# Patient Record
Sex: FEMALE | Race: WHITE | NOT HISPANIC OR LATINO | Employment: OTHER | URBAN - METROPOLITAN AREA
[De-identification: names, ages, dates, MRNs, and addresses within clinical notes are randomized per-mention and may not be internally consistent; named-entity substitution may affect disease eponyms.]

---

## 2022-07-18 ENCOUNTER — TRANSCRIBE ORDERS (OUTPATIENT)
Dept: ADMINISTRATIVE | Facility: HOSPITAL | Age: 57
End: 2022-07-18

## 2022-07-18 DIAGNOSIS — Z12.31 VISIT FOR SCREENING MAMMOGRAM: Primary | ICD-10-CM

## 2022-07-25 ENCOUNTER — APPOINTMENT (OUTPATIENT)
Dept: OTHER | Facility: HOSPITAL | Age: 57
End: 2022-07-25

## 2022-07-25 ENCOUNTER — HOSPITAL ENCOUNTER (OUTPATIENT)
Dept: MAMMOGRAPHY | Facility: HOSPITAL | Age: 57
Discharge: HOME | End: 2022-07-25
Admitting: STUDENT IN AN ORGANIZED HEALTH CARE EDUCATION/TRAINING PROGRAM

## 2022-07-25 DIAGNOSIS — Z12.31 VISIT FOR SCREENING MAMMOGRAM: ICD-10-CM

## 2022-07-25 PROCEDURE — 77067 SCR MAMMO BI INCL CAD: CPT | Performed by: RADIOLOGY

## 2022-07-25 PROCEDURE — 77063 BREAST TOMOSYNTHESIS BI: CPT | Performed by: RADIOLOGY

## 2022-07-25 PROCEDURE — 77067 SCR MAMMO BI INCL CAD: CPT

## 2022-07-25 PROCEDURE — 77063 BREAST TOMOSYNTHESIS BI: CPT

## 2023-10-09 ENCOUNTER — TRANSCRIBE ORDERS (OUTPATIENT)
Dept: ADMINISTRATIVE | Facility: HOSPITAL | Age: 58
End: 2023-10-09
Payer: COMMERCIAL

## 2023-10-09 DIAGNOSIS — Z12.31 SCREENING MAMMOGRAM FOR BREAST CANCER: Primary | ICD-10-CM

## 2023-11-20 ENCOUNTER — HOSPITAL ENCOUNTER (OUTPATIENT)
Dept: MAMMOGRAPHY | Facility: HOSPITAL | Age: 58
Discharge: HOME | End: 2023-11-20
Admitting: INTERNAL MEDICINE
Payer: COMMERCIAL

## 2023-11-20 DIAGNOSIS — Z12.31 SCREENING MAMMOGRAM FOR BREAST CANCER: ICD-10-CM

## 2023-11-20 PROCEDURE — 77063 BREAST TOMOSYNTHESIS BI: CPT

## 2023-11-20 PROCEDURE — 77067 SCR MAMMO BI INCL CAD: CPT

## 2024-01-04 ENCOUNTER — OFFICE VISIT (OUTPATIENT)
Dept: INTERNAL MEDICINE | Facility: CLINIC | Age: 59
End: 2024-01-04
Payer: COMMERCIAL

## 2024-01-04 ENCOUNTER — LAB (OUTPATIENT)
Dept: LAB | Facility: HOSPITAL | Age: 59
End: 2024-01-04
Payer: COMMERCIAL

## 2024-01-04 VITALS
HEART RATE: 68 BPM | BODY MASS INDEX: 29.41 KG/M2 | HEIGHT: 66 IN | OXYGEN SATURATION: 96 % | DIASTOLIC BLOOD PRESSURE: 74 MMHG | TEMPERATURE: 97.8 F | WEIGHT: 183 LBS | SYSTOLIC BLOOD PRESSURE: 128 MMHG

## 2024-01-04 DIAGNOSIS — J45.990 EXERCISE-INDUCED ASTHMA: ICD-10-CM

## 2024-01-04 DIAGNOSIS — Z00.00 ANNUAL PHYSICAL EXAM: ICD-10-CM

## 2024-01-04 DIAGNOSIS — E78.00 PURE HYPERCHOLESTEROLEMIA: ICD-10-CM

## 2024-01-04 DIAGNOSIS — Z91.09 ENVIRONMENTAL ALLERGIES: ICD-10-CM

## 2024-01-04 DIAGNOSIS — E55.9 VITAMIN D INSUFFICIENCY: ICD-10-CM

## 2024-01-04 DIAGNOSIS — A60.09 HERPES SIMPLEX INFECTION OF OTHER SITE OF GENITOURINARY TRACT: ICD-10-CM

## 2024-01-04 DIAGNOSIS — Z11.59 ENCOUNTER FOR HEPATITIS C SCREENING TEST FOR LOW RISK PATIENT: ICD-10-CM

## 2024-01-04 DIAGNOSIS — Z85.828 HISTORY OF SKIN CANCER: ICD-10-CM

## 2024-01-04 DIAGNOSIS — Z00.00 ANNUAL PHYSICAL EXAM: Primary | ICD-10-CM

## 2024-01-04 PROBLEM — A60.00 HERPES SIMPLEX INFECTION OF GENITOURINARY SYSTEM: Status: ACTIVE | Noted: 2024-01-04

## 2024-01-04 PROBLEM — A60.04 HERPES SIMPLEX VULVOVAGINITIS: Status: ACTIVE | Noted: 2024-01-04

## 2024-01-04 LAB
25(OH)D3 SERPL-MCNC: 25 NG/ML (ref 30–100)
ALBUMIN SERPL-MCNC: 4.9 G/DL (ref 3.5–5.2)
ALBUMIN/GLOB SERPL: 2 G/DL
ALP SERPL-CCNC: 69 U/L (ref 39–117)
ALT SERPL W P-5'-P-CCNC: 65 U/L (ref 1–33)
ANION GAP SERPL CALCULATED.3IONS-SCNC: 11 MMOL/L (ref 5–15)
AST SERPL-CCNC: 58 U/L (ref 1–32)
BILIRUB SERPL-MCNC: 0.3 MG/DL (ref 0–1.2)
BUN SERPL-MCNC: 15 MG/DL (ref 8–23)
BUN/CREAT SERPL: 17.4 (ref 7–25)
CALCIUM SPEC-SCNC: 9.9 MG/DL (ref 8.6–10.5)
CHLORIDE SERPL-SCNC: 101 MMOL/L (ref 98–107)
CHOLEST SERPL-MCNC: 300 MG/DL (ref 0–200)
CO2 SERPL-SCNC: 25 MMOL/L (ref 22–29)
CREAT SERPL-MCNC: 0.86 MG/DL (ref 0.57–1)
DEPRECATED RDW RBC AUTO: 38.7 FL (ref 37–54)
EGFRCR SERPLBLD CKD-EPI 2021: 77.5 ML/MIN/1.73
ERYTHROCYTE [DISTWIDTH] IN BLOOD BY AUTOMATED COUNT: 12.3 % (ref 12.3–15.4)
GLOBULIN UR ELPH-MCNC: 2.4 GM/DL
GLUCOSE SERPL-MCNC: 83 MG/DL (ref 65–99)
HBA1C MFR BLD: 5.5 % (ref 4.8–5.6)
HCT VFR BLD AUTO: 44.2 % (ref 34–46.6)
HCV AB SER DONR QL: NORMAL
HDLC SERPL-MCNC: 57 MG/DL (ref 40–60)
HGB BLD-MCNC: 14.6 G/DL (ref 12–15.9)
LDLC SERPL CALC-MCNC: 200 MG/DL (ref 0–100)
LDLC/HDLC SERPL: 3.47 {RATIO}
MCH RBC QN AUTO: 28.8 PG (ref 26.6–33)
MCHC RBC AUTO-ENTMCNC: 33 G/DL (ref 31.5–35.7)
MCV RBC AUTO: 87.2 FL (ref 79–97)
PLATELET # BLD AUTO: 395 10*3/MM3 (ref 140–450)
PMV BLD AUTO: 9.5 FL (ref 6–12)
POTASSIUM SERPL-SCNC: 4.1 MMOL/L (ref 3.5–5.2)
PROT SERPL-MCNC: 7.3 G/DL (ref 6–8.5)
RBC # BLD AUTO: 5.07 10*6/MM3 (ref 3.77–5.28)
SODIUM SERPL-SCNC: 137 MMOL/L (ref 136–145)
TRIGL SERPL-MCNC: 225 MG/DL (ref 0–150)
TSH SERPL DL<=0.05 MIU/L-ACNC: 0.77 UIU/ML (ref 0.27–4.2)
VLDLC SERPL-MCNC: 43 MG/DL (ref 5–40)
WBC NRBC COR # BLD AUTO: 9.22 10*3/MM3 (ref 3.4–10.8)

## 2024-01-04 PROCEDURE — 82306 VITAMIN D 25 HYDROXY: CPT

## 2024-01-04 PROCEDURE — 83036 HEMOGLOBIN GLYCOSYLATED A1C: CPT

## 2024-01-04 PROCEDURE — 99386 PREV VISIT NEW AGE 40-64: CPT | Performed by: INTERNAL MEDICINE

## 2024-01-04 PROCEDURE — 80061 LIPID PANEL: CPT

## 2024-01-04 PROCEDURE — 86803 HEPATITIS C AB TEST: CPT

## 2024-01-04 PROCEDURE — 80050 GENERAL HEALTH PANEL: CPT

## 2024-01-04 RX ORDER — FLUTICASONE PROPIONATE 50 MCG
SPRAY, SUSPENSION (ML) NASAL
COMMUNITY
Start: 2023-09-11

## 2024-01-04 RX ORDER — ALBUTEROL SULFATE 90 UG/1
AEROSOL, METERED RESPIRATORY (INHALATION)
COMMUNITY
Start: 2023-09-11

## 2024-01-04 RX ORDER — VALACYCLOVIR HYDROCHLORIDE 500 MG/1
500 TABLET, FILM COATED ORAL DAILY
COMMUNITY

## 2024-01-04 NOTE — PROGRESS NOTES
Internal Medicine New Patient  Josie Malhotra is a 58 y.o. female who presents today to [facility] and with concerns as outlined below.    Chief Complaint  Chief Complaint   Patient presents with    [facility]    Annual Exam        HPI  Ms. Malhotra comes in today to establish care and for her physical. She is generally healthy. Was last a patient of Jennifer Castro and prior to that Dr. Dimas. She last had labs in 2021 that she brings with her today. Notable for slightly low vitamin D, mild hyperlipidemia, mildly elevated platelet count. She takes very few medications. She is on albuterol for suspected exercise induced asthma, flonase for allergies, valtrex daily for HSV, and a vitamin D-zinc-Mg supplement. She is up to date with mammogram and colonoscopy. She does not follow with GYN and is s/p hysterectomy for prolapse. She declines COVID and flu vaccines. Hesitant to get the RSV vaccine. She does report a Tdap within the last 10y. Denies use of tobacco, ecigarettes, illicit drugs, and drinks alcohol socially. She is going to be seeing dermatology soon. Has various skin lesions on her chest and face. She has a history of nonmelanoma skin cancer on her LLE.           Review of Systems  Review of Systems   Constitutional:  Positive for fatigue.   HENT:  Negative for hearing loss.    Eyes: Negative.    Respiratory: Negative.     Cardiovascular: Negative.    Gastrointestinal:  Positive for diarrhea (intermittently), GERD and indigestion.   Genitourinary: Negative.    Musculoskeletal: Negative.    Skin:  Positive for skin lesions.   Allergic/Immunologic: Positive for environmental allergies.   Neurological:  Positive for memory problem (forgetful).   Psychiatric/Behavioral: Negative.          Past Medical History  History reviewed. No pertinent past medical history.     Surgical History  Past Surgical History:   Procedure Laterality Date    APPENDECTOMY      CATARACT EXTRACTION Bilateral     CHOLECYSTECTOMY       "ECTOPIC PREGNANCY SURGERY      did not have to have fallopian tube removed    HYSTERECTOMY      2002, due to prolapse. Partial with ovaries remaining.    LAPAROSCOPIC TUBAL LIGATION      SKIN CANCER EXCISION Left     LLE        Family History  Family History   Problem Relation Age of Onset    Thyroid disease Mother     Hyperlipidemia Mother     Diabetes Father     Heart disease Father     No Known Problems Brother     Heart disease Brother     Heart disease Paternal Grandfather     Dementia Other         2 out of 4 of dad's siblings    Breast cancer Neg Hx     Ovarian cancer Neg Hx         Social History  Social History     Socioeconomic History    Marital status:    Tobacco Use    Smoking status: Never     Passive exposure: Never    Smokeless tobacco: Never   Vaping Use    Vaping Use: Never used   Substance and Sexual Activity    Alcohol use: Yes     Comment: socially    Drug use: Never    Sexual activity: Yes        Current Medications  Current Outpatient Medications on File Prior to Visit   Medication Sig Dispense Refill    albuterol sulfate  (90 Base) MCG/ACT inhaler       fluticasone (FLONASE) 50 MCG/ACT nasal spray       MAGNESIUM GLYCINATE PO Take  by mouth.      Multiple Vitamins-Minerals (ZINC PO) Take  by mouth.      valACYclovir (VALTREX) 500 MG tablet Take 1 tablet by mouth Daily.      VITAMIN D PO Take  by mouth.      [DISCONTINUED] meloxicam (MOBIC) 7.5 MG tablet Take 1 tablet by mouth Daily. 30 tablet 0     No current facility-administered medications on file prior to visit.       Allergies  No Known Allergies     Objective  Visit Vitals  /74   Pulse 68   Temp 97.8 °F (36.6 °C)   Ht 167.6 cm (66\")   Wt 83 kg (183 lb)   SpO2 96%   BMI 29.54 kg/m²        Physical Exam  Physical Exam  Vitals and nursing note reviewed.   Constitutional:       General: She is not in acute distress.     Appearance: She is well-developed. She is not ill-appearing, toxic-appearing or diaphoretic.   HENT: "      Head: Normocephalic and atraumatic.      Right Ear: Tympanic membrane, ear canal and external ear normal.      Left Ear: Tympanic membrane, ear canal and external ear normal.      Nose: Nose normal.   Eyes:      General: No scleral icterus.     Conjunctiva/sclera: Conjunctivae normal.   Neck:      Thyroid: No thyromegaly.   Cardiovascular:      Rate and Rhythm: Normal rate and regular rhythm.      Heart sounds: Normal heart sounds. No murmur heard.  Pulmonary:      Effort: Pulmonary effort is normal. No respiratory distress.      Breath sounds: Normal breath sounds. No wheezing.   Abdominal:      General: There is no distension.      Palpations: Abdomen is soft. There is no mass.      Tenderness: There is no abdominal tenderness.   Musculoskeletal:         General: No deformity.      Cervical back: Neck supple.      Right lower leg: No edema.      Left lower leg: No edema.   Lymphadenopathy:      Cervical: No cervical adenopathy.   Skin:     General: Skin is warm and dry.   Neurological:      General: No focal deficit present.      Mental Status: She is alert and oriented to person, place, and time.      Gait: Gait normal.   Psychiatric:         Mood and Affect: Mood normal.         Behavior: Behavior normal.         Thought Content: Thought content normal.         Judgment: Judgment normal.          Results  No results found for this or any previous visit.     Assessment and Plan  Diagnoses and all orders for this visit:    Annual physical exam  - Counseling was given to patient for the following topics:  healthy eating habits, disease prevention, importance of self breast exam and breast health, and importance of immunizations, including risks and benefits. Also discussed the importance of regular dental and vision care, as well recommendation for a yearly screening skin exam.  -     CBC (No Diff); Future  -     Comprehensive Metabolic Panel; Future  -     TSH Rfx On Abnormal To Free T4; Future  -      Hemoglobin A1c; Future    Exercise-induced asthma  - uses albuterol PRN    Environmental allergies  - on flonase    Herpes simplex infection of other site of genitourinary tract  - on valtrex 500mg daily for ppx    Pure hypercholesterolemia  - update lipid panel    Vitamin D insufficiency  - takes a low dose vitamin D supplement  - will update vitamin D level    History of skin cancer  - follows with dermatology and reports upcoming appointment    Encounter for hepatitis C screening test for low risk patient  -     Hepatitis C Antibody; Future      Health Maintenance   Topic Date Due    ZOSTER VACCINE (1 of 2) Never done    HEPATITIS C SCREENING  Never done    ANNUAL PHYSICAL  Never done    LIPID PANEL  Never done    TDAP/TD VACCINES (1 - Tdap) 01/01/2026 (Originally 10/9/1982)    MAMMOGRAM  11/20/2024    BMI FOLLOWUP  01/04/2025    COLORECTAL CANCER SCREENING  10/24/2032    Pneumococcal Vaccine 0-64  Aged Out    COVID-19 Vaccine  Discontinued    INFLUENZA VACCINE  Discontinued     Health Maintenance  - Pap smear: s/p hysterectomy  - Mammogram: 11/2023 BIRADS 1  - Colonoscopy: 10/2022, repeat 10y  - HCV: ordered  - Immunizations: flu and COVID declined. RSV discussed. Tdap UTD.  - Depression screening: negative 1/2024    Return in about 1 year (around 1/4/2025) for Annual, Labs today.

## 2024-01-05 ENCOUNTER — PATIENT ROUNDING (BHMG ONLY) (OUTPATIENT)
Dept: INTERNAL MEDICINE | Facility: CLINIC | Age: 59
End: 2024-01-05
Payer: COMMERCIAL

## 2024-01-05 NOTE — PROGRESS NOTES
A My-chart message has been sent to the patient for Patient Rounding with [facility].

## 2024-01-08 ENCOUNTER — PATIENT MESSAGE (OUTPATIENT)
Dept: INTERNAL MEDICINE | Facility: CLINIC | Age: 59
End: 2024-01-08
Payer: COMMERCIAL

## 2024-01-08 DIAGNOSIS — E78.00 PURE HYPERCHOLESTEROLEMIA: Primary | ICD-10-CM

## 2024-01-09 DIAGNOSIS — R74.01 TRANSAMINITIS: Primary | ICD-10-CM

## 2024-02-21 ENCOUNTER — LAB (OUTPATIENT)
Dept: LAB | Facility: HOSPITAL | Age: 59
End: 2024-02-21
Payer: COMMERCIAL

## 2024-02-21 DIAGNOSIS — R74.01 TRANSAMINITIS: ICD-10-CM

## 2024-02-21 DIAGNOSIS — E78.00 PURE HYPERCHOLESTEROLEMIA: ICD-10-CM

## 2024-02-21 LAB
ALBUMIN SERPL-MCNC: 4.5 G/DL (ref 3.5–5.2)
ALP SERPL-CCNC: 60 U/L (ref 39–117)
ALT SERPL W P-5'-P-CCNC: 22 U/L (ref 1–33)
AST SERPL-CCNC: 18 U/L (ref 1–32)
BILIRUB CONJ SERPL-MCNC: <0.2 MG/DL (ref 0–0.3)
BILIRUB INDIRECT SERPL-MCNC: NORMAL MG/DL
BILIRUB SERPL-MCNC: 0.4 MG/DL (ref 0–1.2)
CHOLEST SERPL-MCNC: 214 MG/DL (ref 0–200)
HDLC SERPL-MCNC: 53 MG/DL (ref 40–60)
LDLC SERPL CALC-MCNC: 137 MG/DL (ref 0–100)
LDLC/HDLC SERPL: 2.53 {RATIO}
PROT SERPL-MCNC: 6.8 G/DL (ref 6–8.5)
TRIGL SERPL-MCNC: 134 MG/DL (ref 0–150)
VLDLC SERPL-MCNC: 24 MG/DL (ref 5–40)

## 2024-02-21 PROCEDURE — 80076 HEPATIC FUNCTION PANEL: CPT

## 2024-02-21 PROCEDURE — 80061 LIPID PANEL: CPT

## 2024-04-09 ENCOUNTER — PATIENT MESSAGE (OUTPATIENT)
Dept: INTERNAL MEDICINE | Facility: CLINIC | Age: 59
End: 2024-04-09
Payer: COMMERCIAL

## 2024-04-09 DIAGNOSIS — A60.09 HERPES SIMPLEX INFECTION OF OTHER SITE OF GENITOURINARY TRACT: ICD-10-CM

## 2024-04-09 RX ORDER — VALACYCLOVIR HYDROCHLORIDE 1 G/1
1000 TABLET, FILM COATED ORAL DAILY
Qty: 90 TABLET | Refills: 4 | Status: SHIPPED | OUTPATIENT
Start: 2024-04-09

## 2024-08-12 ENCOUNTER — HOSPITAL ENCOUNTER (OUTPATIENT)
Dept: GENERAL RADIOLOGY | Facility: HOSPITAL | Age: 59
Discharge: HOME | End: 2024-08-12
Admitting: NURSE PRACTITIONER
Payer: COMMERCIAL

## 2024-08-12 ENCOUNTER — OFFICE VISIT (OUTPATIENT)
Dept: INTERNAL MEDICINE | Facility: CLINIC | Age: 59
End: 2024-08-12
Payer: COMMERCIAL

## 2024-08-12 VITALS
DIASTOLIC BLOOD PRESSURE: 72 MMHG | SYSTOLIC BLOOD PRESSURE: 116 MMHG | HEIGHT: 66 IN | HEART RATE: 65 BPM | BODY MASS INDEX: 28.48 KG/M2 | OXYGEN SATURATION: 98 % | WEIGHT: 177.2 LBS

## 2024-08-12 DIAGNOSIS — R10.9 SIDE PAIN: Primary | ICD-10-CM

## 2024-08-12 DIAGNOSIS — R10.9 LEFT FLANK PAIN: ICD-10-CM

## 2024-08-12 DIAGNOSIS — M54.6 ACUTE LEFT-SIDED THORACIC BACK PAIN: ICD-10-CM

## 2024-08-12 DIAGNOSIS — R10.9 SIDE PAIN: ICD-10-CM

## 2024-08-12 LAB
BILIRUB BLD-MCNC: NEGATIVE MG/DL
CLARITY, POC: CLEAR
COLOR UR: ABNORMAL
EXPIRATION DATE: ABNORMAL
GLUCOSE UR STRIP-MCNC: NEGATIVE MG/DL
KETONES UR QL: NEGATIVE
LEUKOCYTE EST, POC: NEGATIVE
Lab: ABNORMAL
NITRITE UR-MCNC: NEGATIVE MG/ML
PH UR: 6 [PH] (ref 5–8)
PROT UR STRIP-MCNC: NEGATIVE MG/DL
RBC # UR STRIP: NEGATIVE /UL
SP GR UR: 1.01 (ref 1–1.03)
UROBILINOGEN UR QL: ABNORMAL

## 2024-08-12 PROCEDURE — 74018 RADEX ABDOMEN 1 VIEW: CPT

## 2024-08-12 PROCEDURE — 99214 OFFICE O/P EST MOD 30 MIN: CPT | Performed by: NURSE PRACTITIONER

## 2024-08-12 PROCEDURE — 81003 URINALYSIS AUTO W/O SCOPE: CPT | Performed by: NURSE PRACTITIONER

## 2024-08-12 RX ORDER — CYCLOBENZAPRINE HCL 5 MG
10 TABLET ORAL NIGHTLY PRN
Qty: 15 TABLET | Refills: 0 | Status: SHIPPED | OUTPATIENT
Start: 2024-08-12

## 2024-08-12 NOTE — PROGRESS NOTES
Office Note     Name: Josie Malhotra    : 1965     MRN: [id number]     Chief Complaint  Back Pain (Patient reports having lower back pain going on for around 3 weeks. Patient states she did have appendicitic back in 2018 and she says the pain feels exactly like that. Patient states its almost like everything is swollen. )    Subjective     History of Present Illness:  Josie Malhotra is a 58 y.o. female who presents today for evaluation of low back pain.  Patient reports symptoms have been ongoing for the past 3 weeks.  Patient describes the pain to her left thoracic area.  She reports the pain radiates around to the side of her abdomen.  She has had a similar feeling in the past that was relieved by heat.  She has tried heat this time with no improvement in symptoms.  She reports the pain and discomfort has previously come and gone until now.  She reports it has been constant and persistent since yesterday.  She reports the area feels full and swollen.  She initially thought it was muscular but has not yet gone away.  She denies any nausea, vomiting, diarrhea, or constipation.  She denies any changes to her urine.  She denies any history of kidney stones.  She denies any history of hematuria.  She has been taking Tylenol as needed.  She has also been drinking more water recently.  She follows with Dr. Rayo for chronic conditions.  She presents today for evaluation of the above acute complaints.  No further complaints or concerns at this time.  Pleasant visit with the patient today.      History reviewed. No pertinent past medical history.    Past Surgical History:   Procedure Laterality Date    APPENDECTOMY      CATARACT EXTRACTION Bilateral     CHOLECYSTECTOMY      ECTOPIC PREGNANCY SURGERY      did not have to have fallopian tube removed    HYSTERECTOMY      , due to prolapse. Partial with ovaries remaining.    LAPAROSCOPIC TUBAL LIGATION      SKIN CANCER EXCISION Left     LLE       Social History  "    Socioeconomic History    Marital status:    Tobacco Use    Smoking status: Never     Passive exposure: Never    Smokeless tobacco: Never   Vaping Use    Vaping status: Never Used   Substance and Sexual Activity    Alcohol use: Yes     Comment: socially    Drug use: Never    Sexual activity: Yes         Current Outpatient Medications:     albuterol sulfate  (90 Base) MCG/ACT inhaler, , Disp: , Rfl:     fluticasone (FLONASE) 50 MCG/ACT nasal spray, , Disp: , Rfl:     MAGNESIUM GLYCINATE PO, Take  by mouth., Disp: , Rfl:     Multiple Vitamins-Minerals (ZINC PO), Take  by mouth., Disp: , Rfl:     valACYclovir (VALTREX) 1000 MG tablet, Take 1 tablet by mouth Daily., Disp: 90 tablet, Rfl: 4    VITAMIN D PO, Take  by mouth., Disp: , Rfl:     cyclobenzaprine (FLEXERIL) 5 MG tablet, Take 2 tablets by mouth At Night As Needed for Muscle Spasms., Disp: 15 tablet, Rfl: 0    Objective     Vital Signs  /72   Pulse 65   Ht 167.6 cm (66\")   Wt 80.4 kg (177 lb 3.2 oz)   SpO2 98%   BMI 28.60 kg/m²   Estimated body mass index is 28.6 kg/m² as calculated from the following:    Height as of this encounter: 167.6 cm (66\").    Weight as of this encounter: 80.4 kg (177 lb 3.2 oz).           Physical Exam  Constitutional:       General: She is not in acute distress.     Appearance: Normal appearance. She is not ill-appearing.   HENT:      Head: Normocephalic and atraumatic.      Nose: Nose normal.   Eyes:      Extraocular Movements: Extraocular movements intact.      Conjunctiva/sclera: Conjunctivae normal.      Pupils: Pupils are equal, round, and reactive to light.   Cardiovascular:      Rate and Rhythm: Normal rate.   Pulmonary:      Effort: Pulmonary effort is normal. No respiratory distress.   Abdominal:      Tenderness: There is abdominal tenderness.   Musculoskeletal:         General: Normal range of motion.      Cervical back: Neck supple.   Skin:     General: Skin is warm and dry.   Neurological:      " General: No focal deficit present.      Mental Status: She is alert and oriented to person, place, and time. Mental status is at baseline.   Psychiatric:         Mood and Affect: Mood normal.         Behavior: Behavior normal.         Thought Content: Thought content normal.         Judgment: Judgment normal.          Assessment and Plan     Diagnoses and all orders for this visit:    1. Side pain (Primary)  -     XR Abdomen KUB; Future  -     cyclobenzaprine (FLEXERIL) 5 MG tablet; Take 2 tablets by mouth At Night As Needed for Muscle Spasms.  Dispense: 15 tablet; Refill: 0  -     POCT urinalysis dipstick, automated    2. Left flank pain  -     XR Abdomen KUB; Future  -     cyclobenzaprine (FLEXERIL) 5 MG tablet; Take 2 tablets by mouth At Night As Needed for Muscle Spasms.  Dispense: 15 tablet; Refill: 0  -     POCT urinalysis dipstick, automated    3. Acute left-sided thoracic back pain  -     XR Abdomen KUB; Future  -     cyclobenzaprine (FLEXERIL) 5 MG tablet; Take 2 tablets by mouth At Night As Needed for Muscle Spasms.  Dispense: 15 tablet; Refill: 0  -     POCT urinalysis dipstick, automated    Plan:  UA in the office today bland.  Orders placed for KUB for further evaluation of symptoms.  May continue with Tylenol as needed.  Flexeril sent to the pharmacy on file to assist with symptoms.  May take nightly as needed.  Further plan of care based on imaging results.  Keep scheduled follow-up appointment with Dr. Rayo.    Follow Up  Return if symptoms worsen or fail to improve, for Follow up with Dr. Rayo.    VICTORINO Turner    Part of this note may be an electronic transcription/translation of spoken language to printed text using the Dragon Dictation System.  Answers submitted by the patient for this visit:  Primary Reason for Visit (Submitted on 8/12/2024)  What is the primary reason for your visit?: Back Pain  Back Pain Questionnaire (Submitted on 8/12/2024)  Chief Complaint: Back pain  Chronicity:  new  Onset: 1 to 4 weeks ago  Frequency: intermittently  Progression since onset: worsening  Pain location: lumbar spine  Pain quality: aching, stabbing  Radiates to: left buttock  Pain - numeric: 8/10  Pain is: the same all the time  Aggravated by: position  Stiffness is present: all day  abdominal pain: No  bladder incontinence: No  bowel incontinence: No  chest pain: No  dysuria: No  fever: No  leg pain: No  numbness: No  paresthesias: No  pelvic pain: No  perianal numbness: No  tingling: No  weakness: No  weight loss: No  Risk factors: menopause

## 2024-09-05 NOTE — PROGRESS NOTES
"    Office Note     Name: Josie Malhotra    : 1965     MRN: [id number]     Chief Complaint  Foot Pain (Bilateral- \"nodules in arches of both feet\" )    Subjective     History of Present Illness:  Josie Malhotra is a 58 y.o. female who presents today for foot pain.    Patient regularly follows with Dr. Rayo with a next scheduled visit in January    Patient sent a message through the portal on  regarding the lumps in the arches of her feet.  She did google and there was a concern for a plantar fibroma.  She does describe them as painful.  Patient states that she does enjoy getting a pedicure because a massage is helpful on the discomfort of her feet.  She does work to wear well supporting well-fitting shoes.        Past Medical History:   Diagnosis Date    Cataract 2022 - surgery    Cholelithiasis Removed       Past Surgical History:   Procedure Laterality Date    APPENDECTOMY      CATARACT EXTRACTION Bilateral     CHOLECYSTECTOMY      COLONOSCOPY      ECTOPIC PREGNANCY SURGERY      did not have to have fallopian tube removed    HYSTERECTOMY      , due to prolapse. Partial with ovaries remaining.    LAPAROSCOPIC TUBAL LIGATION      SKIN CANCER EXCISION Left     LLE    SUBTOTAL HYSTERECTOMY  Nov ???       Social History     Socioeconomic History    Marital status:    Tobacco Use    Smoking status: Never     Passive exposure: Never    Smokeless tobacco: Never   Vaping Use    Vaping status: Never Used   Substance and Sexual Activity    Alcohol use: Not Currently     Comment: socially    Drug use: Never    Sexual activity: Yes     Partners: Male     Birth control/protection: Hysterectomy         Current Outpatient Medications:     albuterol sulfate  (90 Base) MCG/ACT inhaler, , Disp: , Rfl:     cyclobenzaprine (FLEXERIL) 5 MG tablet, Take 2 tablets by mouth At Night As Needed for Muscle Spasms., Disp: 15 tablet, Rfl: 0    fluticasone (FLONASE) 50 MCG/ACT nasal spray, , Disp: , Rfl: " "    MAGNESIUM GLYCINATE PO, Take  by mouth., Disp: , Rfl:     Multiple Vitamins-Minerals (ZINC PO), Take  by mouth., Disp: , Rfl:     valACYclovir (VALTREX) 1000 MG tablet, Take 1 tablet by mouth Daily., Disp: 90 tablet, Rfl: 4    VITAMIN D PO, Take  by mouth., Disp: , Rfl:     Objective     Vital Signs  /78 (BP Location: Left arm, Patient Position: Sitting, Cuff Size: Adult)   Pulse 65   Temp 96.8 °F (36 °C) (Temporal)   Resp 16   Ht 167.6 cm (66\")   Wt 80.8 kg (178 lb 3.2 oz)   SpO2 96%   BMI 28.76 kg/m²   Estimated body mass index is 28.76 kg/m² as calculated from the following:    Height as of this encounter: 167.6 cm (66\").    Weight as of this encounter: 80.8 kg (178 lb 3.2 oz).             Physical Exam  Vitals and nursing note reviewed.   Constitutional:       Appearance: Normal appearance.   HENT:      Head: Normocephalic and atraumatic.   Eyes:      Extraocular Movements: Extraocular movements intact.      Pupils: Pupils are equal, round, and reactive to light.   Cardiovascular:      Rate and Rhythm: Normal rate and regular rhythm.   Pulmonary:      Effort: Pulmonary effort is normal.   Musculoskeletal:         General: Normal range of motion.   Feet:      Comments: On the plantar aspect of both feet there was noted scattered small circular areas that were tender.  No erythema.  No open areas.  No drainage.  No antalgic gait noted  Skin:     General: Skin is warm and dry.   Neurological:      Mental Status: She is alert and oriented to person, place, and time.   Psychiatric:         Mood and Affect: Mood normal.         Behavior: Behavior normal.                   Assessment and Plan     Diagnoses and all orders for this visit:    1. Bilateral foot pain (Primary)  -     XR Foot 3+ View Right; Future  -     XR Foot 3+ View Left; Future    2. Mass of foot, bilateral    Plan  Discussed with patient that we will move forward with x-ray of bilateral feet.  She will ideally have this done within the " next few days.  She will be notified of results as they return  Consider at home interventions of Tylenol and Motrin as you are able.  Ice versus heat at your preference.  Kinesiology tape.  She was provided recommendations about podiatry.  We did discuss she did not need a referral to podiatry but I would recommend she call to make an appointment  Go to ER if any condition worsens or severe  Plan follow-up as scheduled    Follow Up  Return for Next scheduled follow up.    VICTORINO Morgan    Part of this note may be an electronic transcription/translation of spoken language to printed text using the Dragon Dictation System.

## 2024-09-06 ENCOUNTER — OFFICE VISIT (OUTPATIENT)
Dept: INTERNAL MEDICINE | Facility: CLINIC | Age: 59
End: 2024-09-06
Payer: COMMERCIAL

## 2024-09-06 VITALS
HEART RATE: 65 BPM | WEIGHT: 178.2 LBS | TEMPERATURE: 96.8 F | RESPIRATION RATE: 16 BRPM | HEIGHT: 66 IN | DIASTOLIC BLOOD PRESSURE: 78 MMHG | SYSTOLIC BLOOD PRESSURE: 112 MMHG | OXYGEN SATURATION: 96 % | BODY MASS INDEX: 28.64 KG/M2

## 2024-09-06 DIAGNOSIS — R22.43: ICD-10-CM

## 2024-09-06 DIAGNOSIS — M79.671 BILATERAL FOOT PAIN: Primary | ICD-10-CM

## 2024-09-06 DIAGNOSIS — M79.672 BILATERAL FOOT PAIN: Primary | ICD-10-CM

## 2024-09-06 PROCEDURE — 99213 OFFICE O/P EST LOW 20 MIN: CPT | Performed by: NURSE PRACTITIONER

## 2024-10-07 ENCOUNTER — TRANSCRIBE ORDERS (OUTPATIENT)
Dept: ADMINISTRATIVE | Facility: HOSPITAL | Age: 59
End: 2024-10-07
Payer: COMMERCIAL

## 2024-10-07 DIAGNOSIS — Z12.31 SCREENING MAMMOGRAM FOR BREAST CANCER: Primary | ICD-10-CM

## 2024-10-22 ENCOUNTER — OFFICE VISIT (OUTPATIENT)
Age: 59
End: 2024-10-22
Payer: COMMERCIAL

## 2024-10-22 VITALS
DIASTOLIC BLOOD PRESSURE: 80 MMHG | SYSTOLIC BLOOD PRESSURE: 116 MMHG | BODY MASS INDEX: 30.34 KG/M2 | WEIGHT: 182.1 LBS | HEIGHT: 65 IN
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
## 2024-10-22 DIAGNOSIS — S46.002A INJURY OF LEFT ROTATOR CUFF, INITIAL ENCOUNTER: Primary | ICD-10-CM

## 2024-10-22 DIAGNOSIS — M75.42 IMPINGEMENT SYNDROME OF LEFT SHOULDER: ICD-10-CM

## 2024-10-22 DIAGNOSIS — M75.52 BURSITIS OF LEFT SHOULDER: ICD-10-CM

## 2024-10-22 RX ORDER — VERAPAMIL HCL 100 %
POWDER (GRAM) MISCELLANEOUS
COMMUNITY
Start: 2024-09-12

## 2024-10-22 NOTE — PROGRESS NOTES
[facility] [place] Orthopaedic Surgery Office Visit - Wesley Davis MD  [facility]    Office Visit       Patient Name: Josie Malhotra    Chief Complaint:   Chief Complaint   Patient presents with    Left Shoulder - Pain       Referring Physician: Andie Jung APRN    History of Present Illness:   Josie Malhotra is a 59 y.o. female who presents with left body part: shoulder Reason: pain.  Onset:Onset: mechanical fall. The issue has been ongoing for 3 day(s). Pain is a 5/10 on the pain scale. Pain is described as Pain Characterization: dull, aching, and shooting. Associated symptoms include Symptoms: pain. The pain is worse with sleeping and any movement of the joint; resting, sitting, ice, and NSAIDs improve the pain. Previous treatments have included: NSAIDS.  I have reviewed the patient's history of present illness as noted/entered above.    I have reviewed the patient's past medical history, surgical history, social history, family history, medications, and allergies as noted in the electronic medical record and as noted/entered.  I have reviewed the patient's review of systems as noted/enter and updated as noted in the patient's HPI.    Left shoulder   traumatic injury  Mechanical fall 3 days prior to presentation  Felt like something popped out and popped back in after mechanical fall  Urgent treatment center visit on 10/20/2024 visit reviewed;  Date of injury 10/19/2024  Acute injury and inability to lift left shoulder  Stepped on stacked bricks at home, hyperextended left arm  Scrape under elbow  Treated with rest, sling  Enjoys: outdoors, reading/audio books; [employer] -- enjoying with her daughter and friend      59 y.o. female  BMI 30.30    Subjective   Subjective      Review of Systems   Constitutional: Negative.  Negative for chills, fatigue and fever.   HENT: Negative.  Negative for congestion  and dental problem.    Eyes: Negative.  Negative for blurred vision.   Respiratory: Negative.  Negative for shortness of breath.    Cardiovascular: Negative.  Negative for leg swelling.   Gastrointestinal: Negative.  Negative for abdominal pain.   Endocrine: Negative.  Negative for polyuria.   Genitourinary: Negative.  Negative for difficulty urinating.   Musculoskeletal:  Positive for arthralgias.   Skin: Negative.    Allergic/Immunologic: Negative.    Neurological: Negative.    Hematological: Negative.  Negative for adenopathy.   Psychiatric/Behavioral: Negative.  Negative for behavioral problems.         Past Medical History:   Past Medical History:   Diagnosis Date    Cataract Jan 2022 - surgery    Cholelithiasis Removed       Past Surgical History:   Past Surgical History:   Procedure Laterality Date    APPENDECTOMY      CATARACT EXTRACTION Bilateral     CHOLECYSTECTOMY      COLONOSCOPY      ECTOPIC PREGNANCY SURGERY      did not have to have fallopian tube removed    HYSTERECTOMY      2002, due to prolapse. Partial with ovaries remaining.    LAPAROSCOPIC TUBAL LIGATION      SKIN CANCER EXCISION Left     LLE    SUBTOTAL HYSTERECTOMY  Nov ???       Family History:   Family History   Problem Relation Age of Onset    Thyroid disease Mother     Hyperlipidemia Mother     Diabetes Father     Heart disease Father     No Known Problems Brother     Heart disease Brother     Heart disease Paternal Grandfather     Dementia Other         2 out of 4 of dad's siblings    Breast cancer Neg Hx     Ovarian cancer Neg Hx        Social History:   Social History     Socioeconomic History    Marital status:    Tobacco Use    Smoking status: Never     Passive exposure: Never    Smokeless tobacco: Never   Vaping Use    Vaping status: Never Used   Substance and Sexual Activity    Alcohol use: Not Currently     Comment: socially    Drug use: Never    Sexual activity: Yes     Partners: Male     Birth control/protection:  "Hysterectomy       Medications:   Current Outpatient Medications:     albuterol sulfate  (90 Base) MCG/ACT inhaler, , Disp: , Rfl:     cyclobenzaprine (FLEXERIL) 5 MG tablet, Take 2 tablets by mouth At Night As Needed for Muscle Spasms., Disp: 15 tablet, Rfl: 0    fluticasone (FLONASE) 50 MCG/ACT nasal spray, , Disp: , Rfl:     ibuprofen (ADVIL,MOTRIN) 800 MG tablet, Take 1 tablet by mouth Every 8 (Eight) Hours As Needed for Moderate Pain for up to 7 days., Disp: 21 tablet, Rfl: 0    MAGNESIUM GLYCINATE PO, Take  by mouth., Disp: , Rfl:     Multiple Vitamins-Minerals (ZINC PO), Take  by mouth., Disp: , Rfl:     valACYclovir (VALTREX) 1000 MG tablet, Take 1 tablet by mouth Daily., Disp: 90 tablet, Rfl: 4    Verapamil HCl powder, , Disp: , Rfl:     VITAMIN D PO, Take  by mouth., Disp: , Rfl:     Allergies: No Known Allergies    The following portions of the patient's history were reviewed and updated as appropriate: allergies, current medications, past family history, past medical history, past social history, past surgical history and problem list.        Objective    Objective      Vital Signs:   Vitals:    10/22/24 0832   BP: 116/80   Weight: 82.6 kg (182 lb 1.6 oz)   Height: 165.1 cm (65\")       Ortho Exam:  LEFT SHOULDER  General: no acute distress, comfortable  Vitals reviewed in chart    Musculoskeletal Exam    SIDE: LEFT SHOULDER  Shoulder Exam:    Tenderness: rotator cuff    Range of motion measurements (degrees)  Forward flexion/Abduction/External rotation at side/ER at 90/IR at 90/IR position  Active: pain limited 120/120/45/80/60  Passive: pain limited    Painful arc of motion: yes  No evidence of septic joint  Pain with forward flexion and abduction greater: 90 degrees  Impingement testing Neer's test - positive/painful  Impingement testing Hawkin's test - positive/painful    Rotator Cuff Testing:  Tenderness to palpation at rotator cuff - YES  Rotator cuff testing Marvin's test - positive  Rotator " cuff testing External rotation - no  Rotator cuff testing Lag signs - no  Rotator cuff testing Belly press - no  Pain with abduction great than 90 degrees - yes    Scapular dyskinesis - present, abnormal scapular motion    Long head of the biceps testing:  Escudero's test for biceps & Speed's test - mild  Bicipital groove tenderness to palpation/tenderness to palpation of biceps tendon - mild      Results Review:   Imaging Results (Last 24 Hours)       ** No results found for the last 24 hours. **          XR Shoulder 1 View Bilateral    Result Date: 10/20/2024  Negative radiographs of the shoulder. Electronically Signed: Ameya Miguel MD  10/20/2024 12:04 PM EDT  Workstation ID: [id number]      I personally reviewed and interpreted the x-rays above.  Left shoulder 2 views no acute bony findings noted.    Procedures           Assessment / Plan      Assessment/Plan:   Problem List Items Addressed This Visit          Musculoskeletal and Injuries    Cause of injury, fall    Relevant Orders    MRI Shoulder Left Without Contrast    Injury of left rotator cuff - Primary    Relevant Orders    MRI Shoulder Left Without Contrast    Impingement syndrome of left shoulder    Relevant Orders    MRI Shoulder Left Without Contrast    Bursitis of left shoulder    Relevant Orders    MRI Shoulder Left Without Contrast       LEFT SHOULDER  ACUTE injury  MRI of the shoulder is recommended.  Indication: suspected rotator cuff tear and to rule out occult fracture, possible dislocation event  The MRI is critical to evaluate for rotator cuff tearing and will help with possible surgical planning.    Pain and weakness after traumatic injury      Follow Up: AFTER LEFT SHOULDER MRI        Wesley Davis MD, FAAOS  Orthopedic Surgeon, Shoulder Surgery  [facility]  Orthopedics and Sports Medicine  [address], [address]  [place] [address]    & New Location:  [facility] Location  [address] [place] [address],  [address]  [place] [address]    10/22/24  09:19 EDT

## 2024-11-06 ENCOUNTER — HOSPITAL ENCOUNTER (OUTPATIENT)
Facility: HOSPITAL | Age: 59
Discharge: HOME | End: 2024-11-06
Admitting: ORTHOPAEDIC SURGERY
Payer: COMMERCIAL

## 2024-11-06 DIAGNOSIS — S46.002A INJURY OF LEFT ROTATOR CUFF, INITIAL ENCOUNTER: ICD-10-CM

## 2024-11-06 DIAGNOSIS — M75.52 BURSITIS OF LEFT SHOULDER: ICD-10-CM
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
## 2024-11-06 DIAGNOSIS — M75.42 IMPINGEMENT SYNDROME OF LEFT SHOULDER: ICD-10-CM

## 2024-11-06 PROCEDURE — 73221 MRI JOINT UPR EXTREM W/O DYE: CPT

## 2024-11-11 ENCOUNTER — TELEPHONE (OUTPATIENT)
Dept: ORTHOPEDIC SURGERY | Facility: CLINIC | Age: 59
End: 2024-11-11
Payer: COMMERCIAL

## 2024-11-11 NOTE — TELEPHONE ENCOUNTER
HUB OK TO RELAY/SCHEDULE:    PLEASE SCHEDULE NEXT AVAIL MRI F/U WITH DR MANSFIELD AT EITHER LOCATION

## 2024-11-14 ENCOUNTER — OFFICE VISIT (OUTPATIENT)
Dept: ORTHOPEDIC SURGERY | Facility: CLINIC | Age: 59
End: 2024-11-14
Payer: COMMERCIAL

## 2024-11-14 VITALS
WEIGHT: 182 LBS | DIASTOLIC BLOOD PRESSURE: 80 MMHG | HEIGHT: 65 IN | BODY MASS INDEX: 30.32 KG/M2 | SYSTOLIC BLOOD PRESSURE: 118 MMHG

## 2024-11-14 DIAGNOSIS — S46.002A INJURY OF LEFT ROTATOR CUFF, INITIAL ENCOUNTER: ICD-10-CM
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
## 2024-11-14 DIAGNOSIS — M75.52 BURSITIS OF LEFT SHOULDER: ICD-10-CM

## 2024-11-14 DIAGNOSIS — M75.112 NONTRAUMATIC INCOMPLETE TEAR OF LEFT ROTATOR CUFF: ICD-10-CM

## 2024-11-14 DIAGNOSIS — S46.812A STRAIN OF LEFT TRAPEZIUS MUSCLE, INITIAL ENCOUNTER: ICD-10-CM

## 2024-11-14 DIAGNOSIS — M75.42 IMPINGEMENT SYNDROME OF LEFT SHOULDER: ICD-10-CM

## 2024-11-14 NOTE — PROGRESS NOTES
[facility] [place] Orthopaedic Surgery Office Follow Up - Wesley Davis MD  [facility]    Office Follow Up Visit       Patient Name: Josie Malhotra    Chief Complaint:   Chief Complaint   Patient presents with    Follow-up     3 week (MRI) follow-up: Injury of left rotator cuff         Referring Physician: No ref. provider found    History of Present Illness:   It has been 3  week(s) since Josie Malhotra's last visit. Josie Malhotra returns to clinic today for F/U: follow-up of leftBody Part: shoulderReason: pain. The issue has been ongoing for 4 week(s). Josie Malhotra rates HIS/HER: herpain at 7/10 on the pain scale. Previous/current treatments: NSAIDS. Current symptoms:Symptoms: pain. The pain is worse with sleeping, lying on affected side, and any movement of the joint; resting and pain medication and/or NSAID improves the pain. Overall, he/she: sheis doing the same. I have reviewed the patient's history of present illness as noted/entered above.    I have reviewed the patient's past medical history, surgical history, social history, family history, medications, and allergies as noted in the electronic medical record and as noted/entered.  I have reviewed the patient's review of systems as noted/enter and updated as noted in the patient's HPI.      Left shoulder   traumatic injury  Mechanical fall 3 days prior to presentation  Felt like something popped out and popped back in after mechanical fall  Urgent treatment center visit on 10/20/2024 visit reviewed;  Date of injury 10/19/2024  Acute injury and inability to lift left shoulder  Stepped on stacked bricks at home, hyperextended left arm  Scrape under elbow  Treated with rest, sling  Enjoys: outdoors, reading/audio books; [employer] -- enjoying with her daughter and friend  Daughter - had baby; 1 week old; [place]        59 y.o. female  BMI  30.30    11/14/2024:  LEFT SHOULDER  MRI completed  Counseled on findings  MRI SHOULDER LEFT WO CONTRAST     Date of Exam: 11/6/2024 4:22 PM EST     Indication: LEFT SHOULDER acute injury; rule out rotator cuff tear.     Comparison: Left shoulder radiographs 10/20/2024     Technique:  Routine multiplanar/multisequence images of the left shoulder were obtained without contrast administration.          Findings:  There is bursal-sided fraying/low-grade tearing of the supraspinatus tendon at the footprint (series 8 image 12-14). There is low-grade partial-thickness, partial-width tear of the posterior junctional fibers of the supraspinatus tendon just proximal to   the footprint (series 8 image 14). There is bursal-sided fraying of the distal superior fibers of the subscapularis tendon at the footprint (series 9 image 19). Otherwise unremarkable appearance of the rotator cuff. Normal bulk and signal intensity of   the rotator cuff muscle bellies.     The long head of the biceps tendon is intact and normally positioned within the intertubercular groove.     Normal alignment of the glenohumeral joint. There is a physiologic amount of glenohumeral joint fluid. There are mild osteoarthritic changes of the glenohumeral joint with some chondral thinning of the central glenoid and central humeral head articular   cartilage without high-grade or full-thickness chondral loss. There is likely some fraying or tearing of the superior glenoid labrum.     There are mild osteoarthritic changes of the acromioclavicular joint. No os acromiale or subacromial enthesophyte. There is edema/trace fluid in the subacromial-subdeltoid bursa.     No focal bony lesion. No fracture or bony contusion.     IMPRESSION:  Impression:  There is low-grade bursal-sided fraying and tearing of the distal supraspinatus tendon at the footprint, with suggestion of a small low-grade partial-width articular-sided tear just proximal to the footprint. There is  minimal bursal-sided fraying of the   distal subscapularis tendon. No full-thickness or retracted rotator cuff tendon tear.     Trace edema/fluid of the subacromial-subdeltoid bursa.     Mild degenerative changes of the glenohumeral and acromioclavicular joints.  Electronically Signed: Nato Hooper MD    11/7/2024 3:44 PM EST    Workstation ID: [id number]  I personally reviewed and personally interpreted the imaging above.  Very small rotator cuff tearing, no obvious fracture  No obvious dislocation signatures      Subjective   Subjective      Review of Systems   Constitutional: Negative.  Negative for chills, fatigue and fever.   HENT: Negative.  Negative for congestion and dental problem.    Eyes: Negative.  Negative for blurred vision.   Respiratory: Negative.  Negative for shortness of breath.    Cardiovascular: Negative.  Negative for leg swelling.   Gastrointestinal: Negative.  Negative for abdominal pain.   Endocrine: Negative.  Negative for polyuria.   Genitourinary: Negative.  Negative for difficulty urinating.   Musculoskeletal:  Positive for arthralgias.   Skin: Negative.    Allergic/Immunologic: Negative.    Neurological: Negative.    Hematological: Negative.  Negative for adenopathy.   Psychiatric/Behavioral: Negative.  Negative for behavioral problems.         Past Medical History:   Past Medical History:   Diagnosis Date    Cataract Jan 2022 - surgery    Cholelithiasis Removed    Rotator cuff syndrome        Past Surgical History:   Past Surgical History:   Procedure Laterality Date    APPENDECTOMY      CATARACT EXTRACTION Bilateral     CHOLECYSTECTOMY      COLONOSCOPY      ECTOPIC PREGNANCY SURGERY      did not have to have fallopian tube removed    HYSTERECTOMY      2002, due to prolapse. Partial with ovaries remaining.    LAPAROSCOPIC TUBAL LIGATION      SKIN CANCER EXCISION Left     LLE    SUBTOTAL HYSTERECTOMY  Nov ???       Family History:   Family History   Problem Relation Age of Onset     "Thyroid disease Mother     Hyperlipidemia Mother     Diabetes Father     Heart disease Father     No Known Problems Brother     Heart disease Brother     Heart disease Paternal Grandfather     Dementia Other         2 out of 4 of dad's siblings    Breast cancer Neg Hx     Ovarian cancer Neg Hx        Social History:   Social History     Socioeconomic History    Marital status:    Tobacco Use    Smoking status: Never     Passive exposure: Never    Smokeless tobacco: Never   Vaping Use    Vaping status: Never Used   Substance and Sexual Activity    Alcohol use: Not Currently     Comment: socially    Drug use: Never    Sexual activity: Yes     Partners: Male     Birth control/protection: Hysterectomy       Medications:   Current Outpatient Medications:     albuterol sulfate  (90 Base) MCG/ACT inhaler, , Disp: , Rfl:     fluticasone (FLONASE) 50 MCG/ACT nasal spray, , Disp: , Rfl:     MAGNESIUM GLYCINATE PO, Take  by mouth., Disp: , Rfl:     Multiple Vitamins-Minerals (ZINC PO), Take  by mouth., Disp: , Rfl:     valACYclovir (VALTREX) 1000 MG tablet, Take 1 tablet by mouth Daily., Disp: 90 tablet, Rfl: 4    Verapamil HCl powder, , Disp: , Rfl:     VITAMIN D PO, Take  by mouth., Disp: , Rfl:     Allergies: No Known Allergies    The following portions of the patient's history were reviewed and updated as appropriate: allergies, current medications, past family history, past medical history, past social history, past surgical history and problem list.        Objective    Objective      Vital Signs:   Vitals:    11/14/24 0754   BP: 118/80   Weight: 82.6 kg (182 lb)   Height: 165.1 cm (65\")       Ortho Exam:  LEFT SHOULDER  Pain with abduction  Upper trapezius pain  Good RC strength today  No bony tenderness    Results Review:  Imaging Results (Last 24 Hours)       ** No results found for the last 24 hours. **            MRI Shoulder Left Without Contrast    Result Date: 11/7/2024  Impression: There is " low-grade bursal-sided fraying and tearing of the distal supraspinatus tendon at the footprint, with suggestion of a small low-grade partial-width articular-sided tear just proximal to the footprint. There is minimal bursal-sided fraying of the distal subscapularis tendon. No full-thickness or retracted rotator cuff tendon tear. Trace edema/fluid of the subacromial-subdeltoid bursa. Mild degenerative changes of the glenohumeral and acromioclavicular joints. Electronically Signed: Nato Hooper MD  11/7/2024 3:44 PM EST  Workstation ID: [id number]    XR Shoulder 1 View Bilateral    Result Date: 10/20/2024  Negative radiographs of the shoulder. Electronically Signed: Ameya Miguel MD  10/20/2024 12:04 PM EDT  Workstation ID: [id number]    MRI SHOULDER LEFT WO CONTRAST     Date of Exam: 11/6/2024 4:22 PM EST     Indication: LEFT SHOULDER acute injury; rule out rotator cuff tear.     Comparison: Left shoulder radiographs 10/20/2024     Technique:  Routine multiplanar/multisequence images of the left shoulder were obtained without contrast administration.          Findings:  There is bursal-sided fraying/low-grade tearing of the supraspinatus tendon at the footprint (series 8 image 12-14). There is low-grade partial-thickness, partial-width tear of the posterior junctional fibers of the supraspinatus tendon just proximal to   the footprint (series 8 image 14). There is bursal-sided fraying of the distal superior fibers of the subscapularis tendon at the footprint (series 9 image 19). Otherwise unremarkable appearance of the rotator cuff. Normal bulk and signal intensity of   the rotator cuff muscle bellies.     The long head of the biceps tendon is intact and normally positioned within the intertubercular groove.     Normal alignment of the glenohumeral joint. There is a physiologic amount of glenohumeral joint fluid. There are mild osteoarthritic changes of the glenohumeral joint with some chondral thinning of the  central glenoid and central humeral head articular   cartilage without high-grade or full-thickness chondral loss. There is likely some fraying or tearing of the superior glenoid labrum.     There are mild osteoarthritic changes of the acromioclavicular joint. No os acromiale or subacromial enthesophyte. There is edema/trace fluid in the subacromial-subdeltoid bursa.     No focal bony lesion. No fracture or bony contusion.     IMPRESSION:  Impression:  There is low-grade bursal-sided fraying and tearing of the distal supraspinatus tendon at the footprint, with suggestion of a small low-grade partial-width articular-sided tear just proximal to the footprint. There is minimal bursal-sided fraying of the   distal subscapularis tendon. No full-thickness or retracted rotator cuff tendon tear.     Trace edema/fluid of the subacromial-subdeltoid bursa.     Mild degenerative changes of the glenohumeral and acromioclavicular joints.           Electronically Signed: Nato Hooper MD    11/7/2024 3:44 PM EST    Workstation ID: [id number]    I personally reviewed and personally interpreted the imaging above.  Very small rotator cuff tearing, no obvious fracture  No obvious dislocation signatures    Procedures            Assessment / Plan      Assessment/Plan:   Problem List Items Addressed This Visit          Musculoskeletal and Injuries    Cause of injury, fall - Primary    Relevant Orders    Ambulatory Referral to Physical Therapy for Evaluation & Treatment    Injury of left rotator cuff    Relevant Orders    Ambulatory Referral to Physical Therapy for Evaluation & Treatment    Impingement syndrome of left shoulder    Relevant Orders    Ambulatory Referral to Physical Therapy for Evaluation & Treatment    Bursitis of left shoulder    Relevant Orders    Ambulatory Referral to Physical Therapy for Evaluation & Treatment    Nontraumatic incomplete tear of left rotator cuff    Relevant Orders    Ambulatory Referral to Physical  Therapy for Evaluation & Treatment    Strain of left trapezius muscle    Relevant Orders    Ambulatory Referral to Physical Therapy for Evaluation & Treatment       Left shoulder reviewed MRI together.  Fortunately no occult fracture.  Does have small partial rotator cuff tearing.  No obvious evidence of instability on the MRI fortunately.  Counseled on physical therapy and conservative course.  Discussed that nonsurgical treatment recommended I think she will do well.    Follow Up: 2 months to reassess      Wesley Davis MD, FAAOS  Orthopedic Surgeon, Shoulder Surgery  [facility]  Orthopedics and Sports Medicine  [address], [address]  [place] [address]    & New Location:  [facility] Location  [address], [address]  [place] [address]    11/14/24  08:13 EST

## 2024-11-15 LAB
NCCN CRITERIA FLAG: NORMAL
TYRER CUZICK SCORE: 7.7

## 2024-11-22 ENCOUNTER — HOSPITAL ENCOUNTER (OUTPATIENT)
Dept: MAMMOGRAPHY | Facility: HOSPITAL | Age: 59
Discharge: HOME | End: 2024-11-22
Admitting: INTERNAL MEDICINE
Payer: COMMERCIAL

## 2024-11-22 DIAGNOSIS — Z12.31 SCREENING MAMMOGRAM FOR BREAST CANCER: ICD-10-CM

## 2024-11-22 PROCEDURE — 77063 BREAST TOMOSYNTHESIS BI: CPT

## 2024-11-22 PROCEDURE — 77067 SCR MAMMO BI INCL CAD: CPT

## 2024-12-02 ENCOUNTER — TELEPHONE (OUTPATIENT)
Dept: INTERNAL MEDICINE | Facility: CLINIC | Age: 59
End: 2024-12-02
Payer: COMMERCIAL

## 2024-12-02 DIAGNOSIS — R92.8 ABNORMAL MAMMOGRAM: Primary | ICD-10-CM

## 2024-12-02 NOTE — TELEPHONE ENCOUNTER
Notified patient that there was an order placed for this, patient verbalized understanding and has no further questions or concerns at this time.

## 2024-12-03 ENCOUNTER — HOSPITAL ENCOUNTER (OUTPATIENT)
Dept: ULTRASOUND IMAGING | Facility: HOSPITAL | Age: 59
Discharge: HOME | End: 2024-12-03
Payer: COMMERCIAL

## 2024-12-03 ENCOUNTER — HOSPITAL ENCOUNTER (OUTPATIENT)
Dept: MAMMOGRAPHY | Facility: HOSPITAL | Age: 59
Discharge: HOME | End: 2024-12-03
Payer: COMMERCIAL

## 2024-12-03 DIAGNOSIS — R92.8 ABNORMAL MAMMOGRAM: ICD-10-CM

## 2024-12-03 PROCEDURE — 77065 DX MAMMO INCL CAD UNI: CPT

## 2024-12-03 PROCEDURE — G0279 TOMOSYNTHESIS, MAMMO: HCPCS

## 2024-12-03 PROCEDURE — 76642 ULTRASOUND BREAST LIMITED: CPT

## 2025-01-15 ENCOUNTER — OFFICE VISIT (OUTPATIENT)
Dept: INTERNAL MEDICINE | Facility: CLINIC | Age: 60
End: 2025-01-15
Payer: COMMERCIAL

## 2025-01-15 VITALS
HEIGHT: 65 IN | OXYGEN SATURATION: 96 % | BODY MASS INDEX: 30.82 KG/M2 | WEIGHT: 185 LBS | DIASTOLIC BLOOD PRESSURE: 80 MMHG | SYSTOLIC BLOOD PRESSURE: 118 MMHG | HEART RATE: 86 BPM

## 2025-01-15 DIAGNOSIS — E55.9 VITAMIN D INSUFFICIENCY: ICD-10-CM

## 2025-01-15 DIAGNOSIS — A60.00 HERPES SIMPLEX INFECTION OF GENITOURINARY SYSTEM: ICD-10-CM

## 2025-01-15 DIAGNOSIS — J45.990 EXERCISE-INDUCED ASTHMA: ICD-10-CM

## 2025-01-15 DIAGNOSIS — M72.2 FIBROMATOSIS OF PLANTAR FASCIA: ICD-10-CM

## 2025-01-15 DIAGNOSIS — E78.00 PURE HYPERCHOLESTEROLEMIA: ICD-10-CM

## 2025-01-15 DIAGNOSIS — E66.09 CLASS 1 OBESITY DUE TO EXCESS CALORIES WITH SERIOUS COMORBIDITY AND BODY MASS INDEX (BMI) OF 30.0 TO 30.9 IN ADULT: ICD-10-CM

## 2025-01-15 DIAGNOSIS — Z00.00 ANNUAL PHYSICAL EXAM: Primary | ICD-10-CM

## 2025-01-15 DIAGNOSIS — Z91.09 ENVIRONMENTAL ALLERGIES: ICD-10-CM

## 2025-01-15 DIAGNOSIS — Z85.828 HISTORY OF SKIN CANCER: ICD-10-CM

## 2025-01-15 DIAGNOSIS — M75.112 NONTRAUMATIC INCOMPLETE TEAR OF LEFT ROTATOR CUFF: ICD-10-CM

## 2025-01-15 DIAGNOSIS — E66.811 CLASS 1 OBESITY DUE TO EXCESS CALORIES WITH SERIOUS COMORBIDITY AND BODY MASS INDEX (BMI) OF 30.0 TO 30.9 IN ADULT: ICD-10-CM

## 2025-01-15 DIAGNOSIS — M72.2 PLANTAR FASCIITIS, BILATERAL: ICD-10-CM
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
## 2025-01-15 PROBLEM — R74.01 TRANSAMINITIS: Status: RESOLVED | Noted: 2024-01-09 | Resolved: 2025-01-15

## 2025-01-15 PROBLEM — S46.002A INJURY OF LEFT ROTATOR CUFF: Status: RESOLVED | Noted: 2024-10-22 | Resolved: 2025-01-15

## 2025-01-15 PROBLEM — S46.812A STRAIN OF LEFT TRAPEZIUS MUSCLE: Status: RESOLVED | Noted: 2024-11-14 | Resolved: 2025-01-15

## 2025-01-15 PROCEDURE — 99396 PREV VISIT EST AGE 40-64: CPT | Performed by: INTERNAL MEDICINE

## 2025-01-15 NOTE — PROGRESS NOTES
Internal Medicine Annual Exam  Josie Malhotra is a 59 y.o. female who presents today for an annual exam and with concerns as outlined below.    Chief Complaint  Chief Complaint   Patient presents with    Annual Exam        HPI  Ms. Malhotra comes in today for her physical. She remains generally healthy however did have to see podiatry due to plantar fasciitis and suspected plantar fibromas and orthopedics due to L rotator cuff injury from a fall. She had injections in the fibromas with decrease in size and she uses a compounded verapamil cream. She also purchased supportive shoes. For her rotator cuff injury she did have MRI which showed an incomplete tear. PT was recommended but never scheduled. She began home exercises and has seen improvement. She is up to date with mammogram and colonoscopy. She recently had dermatology exam and there were no suspicious lesions. She does not follow with GYN and is s/p hysterectomy for prolapse. She has upcoming vision exam. Has had blurring of vision in L eye. She declines COVID and flu vaccines.  Denies use of tobacco, ecigarettes, illicit drugs, and alcohol. She is doing exercise and reports generally healthy diet. She is interested in medication to assist with weight loss.         Review of Systems  Review of Systems   Constitutional: Negative.    Eyes:  Positive for blurred vision.   Respiratory: Negative.     Cardiovascular: Negative.    Gastrointestinal:  Positive for diarrhea (occasional). Negative for constipation.   Genitourinary: Negative.    Musculoskeletal: Negative.    Skin: Negative.    Neurological: Negative.    Psychiatric/Behavioral: Negative.  Positive for stress (home remodel).         Past Medical History  Past Medical History:   Diagnosis Date    Cataract Jan 2022 - surgery    Cholelithiasis Removed    Rotator cuff syndrome         Surgical History  Past Surgical History:   Procedure Laterality Date    APPENDECTOMY      CATARACT EXTRACTION Bilateral      "CHOLECYSTECTOMY      COLONOSCOPY      ECTOPIC PREGNANCY SURGERY      did not have to have fallopian tube removed    HYSTERECTOMY      2002, due to prolapse. Partial with ovaries remaining.    LAPAROSCOPIC TUBAL LIGATION      SKIN CANCER EXCISION Left     LLE    SUBTOTAL HYSTERECTOMY  Nov ???        Family History  Family History   Problem Relation Age of Onset    Thyroid disease Mother     Hyperlipidemia Mother     Diabetes Father     Heart disease Father     No Known Problems Brother     Heart disease Brother     Heart disease Paternal Grandfather     Dementia Other         2 out of 4 of dad's siblings    Breast cancer Neg Hx     Ovarian cancer Neg Hx         Social History  Social History     Socioeconomic History    Marital status:    Tobacco Use    Smoking status: Never     Passive exposure: Never    Smokeless tobacco: Never   Vaping Use    Vaping status: Never Used   Substance and Sexual Activity    Alcohol use: Not Currently     Comment: socially    Drug use: Never    Sexual activity: Yes     Partners: Male     Birth control/protection: Hysterectomy        Current Medications  Current Outpatient Medications on File Prior to Visit   Medication Sig Dispense Refill    fluticasone (FLONASE) 50 MCG/ACT nasal spray       MAGNESIUM GLYCINATE PO Take  by mouth.      Multiple Vitamins-Minerals (ZINC PO) Take  by mouth.      valACYclovir (VALTREX) 1000 MG tablet Take 1 tablet by mouth Daily. 90 tablet 4    Verapamil HCl powder       VITAMIN D PO Take  by mouth.      [DISCONTINUED] albuterol sulfate  (90 Base) MCG/ACT inhaler        No current facility-administered medications on file prior to visit.       Allergies  No Known Allergies     Objective  Visit Vitals  /80   Pulse 86   Ht 165.1 cm (65\")   Wt 83.9 kg (185 lb)   SpO2 96%   BMI 30.79 kg/m²        Physical Exam  Physical Exam  Vitals and nursing note reviewed.   Constitutional:       General: She is not in acute distress.     Appearance: She " is well-developed. She is obese. She is not ill-appearing, toxic-appearing or diaphoretic.   HENT:      Head: Normocephalic and atraumatic.      Right Ear: Tympanic membrane, ear canal and external ear normal.      Left Ear: Tympanic membrane, ear canal and external ear normal.      Nose: Nose normal.   Eyes:      General: No scleral icterus.     Conjunctiva/sclera: Conjunctivae normal.   Neck:      Thyroid: No thyromegaly.   Cardiovascular:      Rate and Rhythm: Normal rate and regular rhythm.      Heart sounds: Normal heart sounds. No murmur heard.  Pulmonary:      Effort: Pulmonary effort is normal. No respiratory distress.      Breath sounds: Normal breath sounds.   Abdominal:      General: There is no distension.      Palpations: Abdomen is soft. There is no mass.      Tenderness: There is no abdominal tenderness.   Musculoskeletal:         General: No deformity.      Cervical back: Neck supple.      Right lower leg: No edema.      Left lower leg: No edema.   Lymphadenopathy:      Cervical: No cervical adenopathy.   Skin:     General: Skin is warm and dry.      Findings: No rash.   Neurological:      Mental Status: She is alert and oriented to person, place, and time. Mental status is at baseline.      Gait: Gait normal.   Psychiatric:         Mood and Affect: Mood normal.         Behavior: Behavior normal.         Thought Content: Thought content normal.         Judgment: Judgment normal.          Results  Results for orders placed or performed in visit on 11/15/24   [facility] GENETIC RISK ASSESSMENT QUESTIONNAIRE - ,    Collection Time: 11/15/24  9:57 PM   Result Value Ref Range    TyrerCuzick 7.7     NCCN NCCN not met         Assessment and Plan  Diagnoses and all orders for this visit:    Annual physical exam  - Counseling was given to patient for the following topics:  appropriate exercise, healthy eating habits, importance of self breast exam and breast health, and importance of immunizations, including risks  and benefits. Also discussed the importance of regular dental and vision care, as well recommendation for a yearly screening skin exam.  -     CBC (No Diff); Future  -     Comprehensive Metabolic Panel; Future  -     TSH Rfx On Abnormal To Free T4; Future  -     Hemoglobin A1c; Future    Exercise-induced asthma  - stable, does not require inhalers     Environmental allergies  - on flonase     Herpes simplex infection of other site of genitourinary tract  - on valtrex 500mg daily for ppx     Pure hypercholesterolemia  - improving on last lipid panel  - continue diet, exercise  - update lipid panel     Vitamin D insufficiency  - takes a vitamin D supplement  - will update vitamin D level     History of skin cancer  - nonmelanoma, on LLE  - follows with dermatology    Fibromatosis of plantar fascia  Plantar fasciitis, bilateral  - s/p injection with podiatry and now using verapamil cream and supportive shoes    Nontraumatic incomplete tear of left rotator cuff  - recommended PT by orthopedics but she was never called to schedule so doing home exercises with improvement    Class 1 obesity due to excess calories with serious comorbidity and body mass index (BMI) of 30.0 to 30.9 in adult  - discussed options including contrave, wegovy, zepbound. She will call insurance to discuss coverage.      Health Maintenance   Topic Date Due    ZOSTER VACCINE (1 of 2) Never done    ANNUAL PHYSICAL  01/04/2025    TDAP/TD VACCINES (1 - Tdap) 01/01/2026 (Originally 10/9/1984)    LIPID PANEL  02/21/2025    BMI FOLLOWUP  11/14/2025    MAMMOGRAM  12/03/2026    COLORECTAL CANCER SCREENING  10/24/2032    HEPATITIS C SCREENING  Completed    Pneumococcal Vaccine 0-64  Aged Out    COVID-19 Vaccine  Discontinued    INFLUENZA VACCINE  Discontinued     Health Maintenance  - Pap smear: s/p hysterectomy  - Mammogram: 12/2024 BIRADS 2  - Colonoscopy: 10/2022, repeat 10y  - HCV: negative  - Immunizations: flu and COVID declined. Tdap due 2026.  -  Depression screening: negative 1/2025     Return in about 1 year (around 1/15/2026) for Annual, Labs today.

## 2025-01-17 ENCOUNTER — LAB (OUTPATIENT)
Dept: LAB | Facility: HOSPITAL | Age: 60
End: 2025-01-17
Payer: COMMERCIAL

## 2025-01-17 DIAGNOSIS — E78.00 PURE HYPERCHOLESTEROLEMIA: ICD-10-CM

## 2025-01-17 DIAGNOSIS — E55.9 VITAMIN D INSUFFICIENCY: ICD-10-CM

## 2025-01-17 DIAGNOSIS — Z00.00 ANNUAL PHYSICAL EXAM: ICD-10-CM

## 2025-01-17 LAB
25(OH)D3 SERPL-MCNC: 28.7 NG/ML (ref 30–100)
ALBUMIN SERPL-MCNC: 4.6 G/DL (ref 3.5–5.2)
ALBUMIN/GLOB SERPL: 1.6 G/DL
ALP SERPL-CCNC: 66 U/L (ref 39–117)
ALT SERPL W P-5'-P-CCNC: 28 U/L (ref 1–33)
ANION GAP SERPL CALCULATED.3IONS-SCNC: 12.3 MMOL/L (ref 5–15)
AST SERPL-CCNC: 29 U/L (ref 1–32)
BILIRUB SERPL-MCNC: 0.6 MG/DL (ref 0–1.2)
BUN SERPL-MCNC: 10 MG/DL (ref 8–23)
BUN/CREAT SERPL: 10.9 (ref 7–25)
CALCIUM SPEC-SCNC: 9.7 MG/DL (ref 8.6–10.5)
CHLORIDE SERPL-SCNC: 103 MMOL/L (ref 98–107)
CHOLEST SERPL-MCNC: 242 MG/DL (ref 0–200)
CO2 SERPL-SCNC: 25.7 MMOL/L (ref 22–29)
CREAT SERPL-MCNC: 0.92 MG/DL (ref 0.57–1)
DEPRECATED RDW RBC AUTO: 40.2 FL (ref 37–54)
EGFRCR SERPLBLD CKD-EPI 2021: 71 ML/MIN/1.73
ERYTHROCYTE [DISTWIDTH] IN BLOOD BY AUTOMATED COUNT: 12.4 % (ref 12.3–15.4)
GLOBULIN UR ELPH-MCNC: 2.9 GM/DL
GLUCOSE SERPL-MCNC: 84 MG/DL (ref 65–99)
HBA1C MFR BLD: 5.3 % (ref 4.8–5.6)
HCT VFR BLD AUTO: 44.6 % (ref 34–46.6)
HDLC SERPL-MCNC: 50 MG/DL (ref 40–60)
HGB BLD-MCNC: 15.2 G/DL (ref 12–15.9)
LDLC SERPL CALC-MCNC: 161 MG/DL (ref 0–100)
LDLC/HDLC SERPL: 3.17 {RATIO}
MCH RBC QN AUTO: 30.9 PG (ref 26.6–33)
MCHC RBC AUTO-ENTMCNC: 34.1 G/DL (ref 31.5–35.7)
MCV RBC AUTO: 90.7 FL (ref 79–97)
PLATELET # BLD AUTO: 395 10*3/MM3 (ref 140–450)
PMV BLD AUTO: 9.7 FL (ref 6–12)
POTASSIUM SERPL-SCNC: 4.3 MMOL/L (ref 3.5–5.2)
PROT SERPL-MCNC: 7.5 G/DL (ref 6–8.5)
RBC # BLD AUTO: 4.92 10*6/MM3 (ref 3.77–5.28)
SODIUM SERPL-SCNC: 141 MMOL/L (ref 136–145)
TRIGL SERPL-MCNC: 168 MG/DL (ref 0–150)
TSH SERPL DL<=0.05 MIU/L-ACNC: 1.54 UIU/ML (ref 0.27–4.2)
VLDLC SERPL-MCNC: 31 MG/DL (ref 5–40)
WBC NRBC COR # BLD AUTO: 7.29 10*3/MM3 (ref 3.4–10.8)

## 2025-01-17 PROCEDURE — 82306 VITAMIN D 25 HYDROXY: CPT

## 2025-01-17 PROCEDURE — 80061 LIPID PANEL: CPT

## 2025-01-17 PROCEDURE — 83036 HEMOGLOBIN GLYCOSYLATED A1C: CPT

## 2025-01-17 PROCEDURE — 80050 GENERAL HEALTH PANEL: CPT

## 2025-04-17 DIAGNOSIS — A60.09 HERPES SIMPLEX INFECTION OF OTHER SITE OF GENITOURINARY TRACT: ICD-10-CM

## 2025-04-17 RX ORDER — VALACYCLOVIR HYDROCHLORIDE 1 G/1
1000 TABLET, FILM COATED ORAL DAILY
Qty: 90 TABLET | Refills: 2 | Status: SHIPPED | OUTPATIENT
Start: 2025-04-17